# Patient Record
Sex: FEMALE | Race: WHITE | NOT HISPANIC OR LATINO
[De-identification: names, ages, dates, MRNs, and addresses within clinical notes are randomized per-mention and may not be internally consistent; named-entity substitution may affect disease eponyms.]

---

## 2017-03-29 ENCOUNTER — APPOINTMENT (OUTPATIENT)
Dept: ORTHOPEDIC SURGERY | Facility: CLINIC | Age: 77
End: 2017-03-29

## 2017-03-29 DIAGNOSIS — G89.29 PAIN IN LEFT KNEE: ICD-10-CM

## 2017-03-29 DIAGNOSIS — M17.12 UNILATERAL PRIMARY OSTEOARTHRITIS, LEFT KNEE: ICD-10-CM

## 2017-03-29 DIAGNOSIS — M25.562 PAIN IN LEFT KNEE: ICD-10-CM

## 2017-03-29 RX ORDER — DOXYCYCLINE HYCLATE 100 MG/1
100 CAPSULE ORAL
Qty: 30 | Refills: 0 | Status: COMPLETED | COMMUNITY
Start: 2017-03-20

## 2017-03-29 RX ORDER — ATOVAQUONE AND PROGUANIL HYDROCHLORIDE 250; 100 MG/1; MG/1
250-100 TABLET, FILM COATED ORAL
Qty: 20 | Refills: 0 | Status: COMPLETED | COMMUNITY
Start: 2016-11-17

## 2017-03-29 RX ORDER — LIDOCAINE AND PRILOCAINE 25; 25 MG/G; MG/G
2.5-2.5 CREAM TOPICAL
Qty: 30 | Refills: 0 | Status: COMPLETED | COMMUNITY
Start: 2016-12-27

## 2017-03-29 RX ORDER — CIPROFLOXACIN HYDROCHLORIDE 500 MG/1
500 TABLET, FILM COATED ORAL
Qty: 20 | Refills: 0 | Status: COMPLETED | COMMUNITY
Start: 2016-10-03

## 2017-03-29 RX ORDER — CICLOPIROX 7.7 MG/G
0.77 GEL TOPICAL
Qty: 30 | Refills: 0 | Status: COMPLETED | COMMUNITY
Start: 2017-02-28

## 2017-03-29 RX ORDER — MUPIROCIN 20 MG/G
2 OINTMENT TOPICAL
Qty: 22 | Refills: 0 | Status: COMPLETED | COMMUNITY
Start: 2017-02-02

## 2017-03-29 RX ORDER — AMOXICILLIN AND CLAVULANATE POTASSIUM 875; 125 MG/1; MG/1
875-125 TABLET, COATED ORAL
Qty: 14 | Refills: 0 | Status: COMPLETED | COMMUNITY
Start: 2016-12-27

## 2017-03-29 RX ORDER — NYSTATIN AND TRIAMCINOLONE ACETONIDE 100000; 1 [USP'U]/G; MG/G
100000-0.1 OINTMENT TOPICAL
Qty: 30 | Refills: 0 | Status: COMPLETED | COMMUNITY
Start: 2016-11-29

## 2020-03-17 ENCOUNTER — TRANSCRIPTION ENCOUNTER (OUTPATIENT)
Age: 80
End: 2020-03-17

## 2020-03-17 RX ORDER — POVIDONE-IODINE 5 %
1 AEROSOL (ML) TOPICAL ONCE
Refills: 0 | Status: COMPLETED | OUTPATIENT
Start: 2020-03-18 | End: 2020-03-18

## 2020-03-17 NOTE — H&P ADULT - HISTORY OF PRESENT ILLNESS
78 yo F with back pain x     Presents today for elective L2-L5 laminectomy, facetectomy, foraminotomy. 78 yo F with back pain x many years without improvement. Pt states symptoms have worsened for 1 mo and now uses cane for assistance outside her home. Pain radiates down both legs left greater than right. Denies numbness/tingling/paresthesias.     Presents today for elective L2-L5 laminectomy, facetectomy, foraminotomy.

## 2020-03-17 NOTE — PATIENT PROFILE ADULT - EQUIPMENT CURRENTLY USED AT HOME
Pt thinks she has a UTI . No appointments with Dr Yusuf available. Please call [phone]   no yes

## 2020-03-17 NOTE — PATIENT PROFILE ADULT - NSPROEDALEARNPREF_GEN_A_NUR
individual instruction/written material individual instruction/verbal instruction/skill demonstration/written material

## 2020-03-17 NOTE — PATIENT PROFILE ADULT - STATED REASON FOR ADMISSION
laminectomy lumbar: facetectomy and forminotomy L2_5 laminectomy lumbar: facetectomy and foraminotomy L2_5

## 2020-03-17 NOTE — H&P ADULT - PROBLEM SELECTOR PLAN 1
Admit to Orthopedic service  For elective L2-L5 laminectomy, facetectomy, foraminotomy  Medically cleared for surgery by Dr. Lucero

## 2020-03-17 NOTE — H&P ADULT - NSHPPHYSICALEXAM_GEN_ALL_CORE
MSK: decreased ROM of lumbar spine secondary to pain    Rest of PE per medical clearance MSK: decreased ROM of lumbar spine secondary to pain  MSK: No deformity or open wounds. No rashes or lesions. EHL/TA/GS/FHL 5/5 BLE. Sensation intact and equal BLE. Skin warm and well perfused. DP palpable BLE. Cap refill brisk.   Rest of PE per medical clearance

## 2020-03-18 ENCOUNTER — INPATIENT (INPATIENT)
Facility: HOSPITAL | Age: 80
LOS: 0 days | Discharge: HOME | DRG: 517 | End: 2020-03-19
Payer: COMMERCIAL

## 2020-03-18 VITALS
OXYGEN SATURATION: 96 % | SYSTOLIC BLOOD PRESSURE: 143 MMHG | TEMPERATURE: 98 F | DIASTOLIC BLOOD PRESSURE: 86 MMHG | HEART RATE: 75 BPM | RESPIRATION RATE: 18 BRPM | HEIGHT: 65 IN | WEIGHT: 141.32 LBS

## 2020-03-18 DIAGNOSIS — Z98.890 OTHER SPECIFIED POSTPROCEDURAL STATES: Chronic | ICD-10-CM

## 2020-03-18 DIAGNOSIS — M48.061 SPINAL STENOSIS, LUMBAR REGION WITHOUT NEUROGENIC CLAUDICATION: ICD-10-CM

## 2020-03-18 RX ORDER — HYDROMORPHONE HYDROCHLORIDE 2 MG/ML
1 INJECTION INTRAMUSCULAR; INTRAVENOUS; SUBCUTANEOUS
Refills: 0 | Status: DISCONTINUED | OUTPATIENT
Start: 2020-03-18 | End: 2020-03-18

## 2020-03-18 RX ORDER — HYDROMORPHONE HYDROCHLORIDE 2 MG/ML
0.5 INJECTION INTRAMUSCULAR; INTRAVENOUS; SUBCUTANEOUS EVERY 4 HOURS
Refills: 0 | Status: DISCONTINUED | OUTPATIENT
Start: 2020-03-18 | End: 2020-03-19

## 2020-03-18 RX ORDER — CEFAZOLIN SODIUM 1 G
2000 VIAL (EA) INJECTION EVERY 8 HOURS
Refills: 0 | Status: COMPLETED | OUTPATIENT
Start: 2020-03-18 | End: 2020-03-19

## 2020-03-18 RX ORDER — ACETAMINOPHEN 500 MG
975 TABLET ORAL EVERY 8 HOURS
Refills: 0 | Status: DISCONTINUED | OUTPATIENT
Start: 2020-03-18 | End: 2020-03-19

## 2020-03-18 RX ORDER — SODIUM CHLORIDE 9 MG/ML
1000 INJECTION, SOLUTION INTRAVENOUS
Refills: 0 | Status: DISCONTINUED | OUTPATIENT
Start: 2020-03-18 | End: 2020-03-19

## 2020-03-18 RX ORDER — OXYCODONE HYDROCHLORIDE 5 MG/1
10 TABLET ORAL EVERY 4 HOURS
Refills: 0 | Status: DISCONTINUED | OUTPATIENT
Start: 2020-03-18 | End: 2020-03-19

## 2020-03-18 RX ORDER — OXYCODONE HYDROCHLORIDE 5 MG/1
5 TABLET ORAL EVERY 4 HOURS
Refills: 0 | Status: DISCONTINUED | OUTPATIENT
Start: 2020-03-18 | End: 2020-03-19

## 2020-03-18 RX ORDER — CHLORHEXIDINE GLUCONATE 213 G/1000ML
1 SOLUTION TOPICAL ONCE
Refills: 0 | Status: COMPLETED | OUTPATIENT
Start: 2020-03-18 | End: 2020-03-18

## 2020-03-18 RX ADMIN — Medication 975 MILLIGRAM(S): at 22:21

## 2020-03-18 RX ADMIN — OXYCODONE HYDROCHLORIDE 10 MILLIGRAM(S): 5 TABLET ORAL at 16:41

## 2020-03-18 RX ADMIN — OXYCODONE HYDROCHLORIDE 10 MILLIGRAM(S): 5 TABLET ORAL at 20:10

## 2020-03-18 RX ADMIN — OXYCODONE HYDROCHLORIDE 10 MILLIGRAM(S): 5 TABLET ORAL at 16:06

## 2020-03-18 RX ADMIN — Medication 975 MILLIGRAM(S): at 14:17

## 2020-03-18 RX ADMIN — HYDROMORPHONE HYDROCHLORIDE 1 MILLIGRAM(S): 2 INJECTION INTRAMUSCULAR; INTRAVENOUS; SUBCUTANEOUS at 14:42

## 2020-03-18 RX ADMIN — Medication 100 MILLIGRAM(S): at 16:06

## 2020-03-18 RX ADMIN — HYDROMORPHONE HYDROCHLORIDE 1 MILLIGRAM(S): 2 INJECTION INTRAMUSCULAR; INTRAVENOUS; SUBCUTANEOUS at 15:07

## 2020-03-18 RX ADMIN — Medication 975 MILLIGRAM(S): at 21:21

## 2020-03-18 RX ADMIN — CHLORHEXIDINE GLUCONATE 1 APPLICATION(S): 213 SOLUTION TOPICAL at 05:41

## 2020-03-18 RX ADMIN — Medication 975 MILLIGRAM(S): at 14:38

## 2020-03-18 RX ADMIN — Medication 1 APPLICATION(S): at 06:20

## 2020-03-18 RX ADMIN — OXYCODONE HYDROCHLORIDE 10 MILLIGRAM(S): 5 TABLET ORAL at 21:10

## 2020-03-18 NOTE — PACU DISCHARGE NOTE - COMMENTS
pt met criteria for discharge- s/p L2-L4 laminecttomy and foraminotomy with lumbar initial gauze/tegaderm dressing CDI and right mid back Hemovac drain without output. received pt from OR- somnolent but easily arousable and responsive to voice and tactile stimuli- Able to move all extremities- c/o of  pre-op back Pain w/o sensory and neuro deficits- Bilateral +2 palpable DP and PD- pt denies Pain at present was placed prone in OR- facial patric and slight blanchable redness noted which will resolve-No S&S of skin breakdown pt met criteria for discharge- s/p L2-L4 laminecttomy and foraminotomy with lumbar initial gauze/tegaderm dressing CDI and right mid back Hemovac drain without output. received pt from OR- somnolent but easily arousable and responsive to voice and tactile stimuli- Able to move all extremities- c/o of  pre-op back Pain w/o sensory and neuro deficits- Bilateral +2 palpable DP and PD- pt  was placed prone in OR- facial patric and slight blanchable redness noted which will resolve-No S&S of skin breakdown- pt tolerating po intake and jenna crackers well.-denies N/V-pt expresses Pain reliefs/p tylenols ppt left unit bed on supplemental oxygen to 845-1

## 2020-03-18 NOTE — PROGRESS NOTE ADULT - SUBJECTIVE AND OBJECTIVE BOX
Orthopaedics Post Op Check    Procedure: Laminectomy L2-L5  Surgeon: Dr. Howard     Pt comfortable, without complaints  Denies CP, SOB, N/V, numbness/tingling     Vital Signs Last 24 Hrs  T(C): 36.4 (18 Mar 2020 06:05), Max: 36.4 (18 Mar 2020 06:05)  T(F): 97.6 (18 Mar 2020 06:05), Max: 97.6 (18 Mar 2020 06:05)  HR: 78 (18 Mar 2020 13:00) (75 - 91)  BP: 117/58 (18 Mar 2020 13:00) (114/58 - 143/86)  BP(mean): 82 (18 Mar 2020 13:00) (81 - 85)  RR: 17 (18 Mar 2020 13:00) (13 - 25)  SpO2: 98% (18 Mar 2020 13:00) (96% - 98%)  AVSS, NAD    Dressing C/D/I; HV x 1   General: Pt Alert and oriented     Pulses: DP pulses 2+   Sensation: SLT in tact to distal bilateral lower extremities   Motor: EHL/FHL/TA/GS 5/5 motor strength bilaterally           Post op XR: fluoroscopy utilized intra op to confirm level     A/P: 79yFemale POD#0 s/p Laminectomy L2-L5   - Stable  - Pain Control  - DVT ppx: SCDs  - Post op abx: Ancef  - PT, WBS: WBAT  - F/U AM Labs

## 2020-03-19 ENCOUNTER — TRANSCRIPTION ENCOUNTER (OUTPATIENT)
Age: 80
End: 2020-03-19

## 2020-03-19 VITALS
SYSTOLIC BLOOD PRESSURE: 124 MMHG | TEMPERATURE: 97 F | HEART RATE: 63 BPM | OXYGEN SATURATION: 97 % | DIASTOLIC BLOOD PRESSURE: 70 MMHG | RESPIRATION RATE: 16 BRPM

## 2020-03-19 LAB
ANION GAP SERPL CALC-SCNC: 13 MMOL/L — SIGNIFICANT CHANGE UP (ref 5–17)
BASOPHILS # BLD AUTO: 0.01 K/UL — SIGNIFICANT CHANGE UP (ref 0–0.2)
BASOPHILS NFR BLD AUTO: 0.1 % — SIGNIFICANT CHANGE UP (ref 0–2)
BUN SERPL-MCNC: 16 MG/DL — SIGNIFICANT CHANGE UP (ref 7–23)
CALCIUM SERPL-MCNC: 8.7 MG/DL — SIGNIFICANT CHANGE UP (ref 8.4–10.5)
CHLORIDE SERPL-SCNC: 102 MMOL/L — SIGNIFICANT CHANGE UP (ref 96–108)
CO2 SERPL-SCNC: 24 MMOL/L — SIGNIFICANT CHANGE UP (ref 22–31)
CREAT SERPL-MCNC: 0.9 MG/DL — SIGNIFICANT CHANGE UP (ref 0.5–1.3)
EOSINOPHIL # BLD AUTO: 0.01 K/UL — SIGNIFICANT CHANGE UP (ref 0–0.5)
EOSINOPHIL NFR BLD AUTO: 0.1 % — SIGNIFICANT CHANGE UP (ref 0–6)
GLUCOSE SERPL-MCNC: 199 MG/DL — HIGH (ref 70–99)
HCT VFR BLD CALC: 44.9 % — SIGNIFICANT CHANGE UP (ref 34.5–45)
HGB BLD-MCNC: 14.7 G/DL — SIGNIFICANT CHANGE UP (ref 11.5–15.5)
IMM GRANULOCYTES NFR BLD AUTO: 0.5 % — SIGNIFICANT CHANGE UP (ref 0–1.5)
LYMPHOCYTES # BLD AUTO: 1.38 K/UL — SIGNIFICANT CHANGE UP (ref 1–3.3)
LYMPHOCYTES # BLD AUTO: 7.7 % — LOW (ref 13–44)
MCHC RBC-ENTMCNC: 29.7 PG — SIGNIFICANT CHANGE UP (ref 27–34)
MCHC RBC-ENTMCNC: 32.7 GM/DL — SIGNIFICANT CHANGE UP (ref 32–36)
MCV RBC AUTO: 90.7 FL — SIGNIFICANT CHANGE UP (ref 80–100)
MONOCYTES # BLD AUTO: 0.87 K/UL — SIGNIFICANT CHANGE UP (ref 0–0.9)
MONOCYTES NFR BLD AUTO: 4.9 % — SIGNIFICANT CHANGE UP (ref 2–14)
NEUTROPHILS # BLD AUTO: 15.5 K/UL — HIGH (ref 1.8–7.4)
NEUTROPHILS NFR BLD AUTO: 86.7 % — HIGH (ref 43–77)
NRBC # BLD: 0 /100 WBCS — SIGNIFICANT CHANGE UP (ref 0–0)
PLATELET # BLD AUTO: 233 K/UL — SIGNIFICANT CHANGE UP (ref 150–400)
POTASSIUM SERPL-MCNC: 4.6 MMOL/L — SIGNIFICANT CHANGE UP (ref 3.5–5.3)
POTASSIUM SERPL-SCNC: 4.6 MMOL/L — SIGNIFICANT CHANGE UP (ref 3.5–5.3)
RBC # BLD: 4.95 M/UL — SIGNIFICANT CHANGE UP (ref 3.8–5.2)
RBC # FLD: 12.6 % — SIGNIFICANT CHANGE UP (ref 10.3–14.5)
SODIUM SERPL-SCNC: 139 MMOL/L — SIGNIFICANT CHANGE UP (ref 135–145)
WBC # BLD: 17.86 K/UL — HIGH (ref 3.8–10.5)
WBC # FLD AUTO: 17.86 K/UL — HIGH (ref 3.8–10.5)

## 2020-03-19 RX ORDER — OXYCODONE HYDROCHLORIDE 5 MG/1
1 TABLET ORAL
Qty: 30 | Refills: 0
Start: 2020-03-19 | End: 2020-03-23

## 2020-03-19 RX ADMIN — OXYCODONE HYDROCHLORIDE 10 MILLIGRAM(S): 5 TABLET ORAL at 11:37

## 2020-03-19 RX ADMIN — OXYCODONE HYDROCHLORIDE 10 MILLIGRAM(S): 5 TABLET ORAL at 12:37

## 2020-03-19 RX ADMIN — OXYCODONE HYDROCHLORIDE 10 MILLIGRAM(S): 5 TABLET ORAL at 05:41

## 2020-03-19 RX ADMIN — Medication 975 MILLIGRAM(S): at 05:40

## 2020-03-19 RX ADMIN — Medication 100 MILLIGRAM(S): at 00:17

## 2020-03-19 RX ADMIN — Medication 975 MILLIGRAM(S): at 14:43

## 2020-03-19 RX ADMIN — Medication 975 MILLIGRAM(S): at 06:40

## 2020-03-19 RX ADMIN — OXYCODONE HYDROCHLORIDE 10 MILLIGRAM(S): 5 TABLET ORAL at 06:40

## 2020-03-19 RX ADMIN — Medication 975 MILLIGRAM(S): at 15:43

## 2020-03-19 NOTE — DISCHARGE NOTE PROVIDER - NSDCFUADDINST_GEN_ALL_CORE_FT
No strenuous activity (bending/twisting), heavy lifting, driving or returning to work until cleared by MD.  Change dressing daily with gauze/tape till post-op day #5, then leave incision open to air.  You may shower post-op day#5, keep incision clean and dry.   Try to have regular bowel movements, take stool softener or laxative if necessary.  May apply ice to affected areas to decrease swelling.  Call to schedule an appt with Dr. Howard for follow up, if you have staples or sutures they will be removed in office.  Contact your doctor if you experience: fever greater than 101.5, chills, chest pain, difficulty breathing, redness or excessive drainage around the incision, other concerns.  Follow up with your primary care provider. No strenuous activity (bending/twisting), heavy lifting, driving or returning to work until cleared by MD.  Record drain output daily and update Dr. Howard, remove drain on Saturday then apply gauze/tegaderm dressing.  You may remove dressing and shower post-op day#5, keep incision clean and dry.   Try to have regular bowel movements, take stool softener or laxative if necessary.  May apply ice to affected areas to decrease swelling.  Call to schedule an appt with Dr. Howard for follow up, if you have staples or sutures they will be removed in office.  Contact your doctor if you experience: fever greater than 101.5, chills, chest pain, difficulty breathing, redness or excessive drainage around the incision, other concerns.  Follow up with your primary care provider.

## 2020-03-19 NOTE — PROGRESS NOTE ADULT - SUBJECTIVE AND OBJECTIVE BOX
Ortho Note    Pt comfortable without complaints, pain controlled  Denies CP, SOB, N/V, numbness/tingling     Vital Signs Last 24 Hrs  T(C): 36.2 (03-19-20 @ 09:08), Max: 36.2 (03-19-20 @ 09:08)  T(F): 97.2 (03-19-20 @ 09:08), Max: 97.2 (03-19-20 @ 09:08)  HR: 63 (03-19-20 @ 09:08) (63 - 63)  BP: 124/70 (03-19-20 @ 09:08) (124/70 - 124/70)  BP(mean): --  RR: 16 (03-19-20 @ 09:08) (16 - 16)  SpO2: 97% (03-19-20 @ 09:08) (97% - 97%)    General: Pt Alert and oriented, NAD  DSG C/D/I back, using aseptic technique stitch removed from drain, new dsg applied  Pulses intact b/l LE  Sensation intact b/l LE  Motor: EHL/FHL/TA/GS 5/5 b/l                          14.7   17.86 )-----------( 233      ( 19 Mar 2020 06:51 )             44.9   19 Mar 2020 06:51    139    |  102    |  16     ----------------------------<  199    4.6     |  24     |  0.90           A/P: 79yFemale POD#1 s/p Lami L2-5  - Stable  - Pain Control  - DVT ppx: scds  - PT, WBS: wbat  - Discussed with Dr. Howard, discharge home with drain, patient instructed on how to empty and record drain output, drain to be removed Saturday. Patient states able and willing to perform task.    Ortho Pager [phone]

## 2020-03-19 NOTE — DISCHARGE NOTE PROVIDER - NSDCCPCAREPLAN_GEN_ALL_CORE_FT
PRINCIPAL DISCHARGE DIAGNOSIS  Diagnosis: Spinal stenosis of lumbar region  Assessment and Plan of Treatment: improvement s/p Lami L2-5

## 2020-03-19 NOTE — PHYSICAL THERAPY INITIAL EVALUATION ADULT - PERTINENT HX OF CURRENT PROBLEM, REHAB EVAL
78 yo F with back pain x many years without improvement. Pt states symptoms have worsened for 1 mo and now uses cane for assistance outside her home. Pain radiates down both legs left greater than right. Denies numbness/tingling/paresthesias.

## 2020-03-19 NOTE — PHYSICAL THERAPY INITIAL EVALUATION ADULT - ADDITIONAL COMMENTS
Pt lives in an elevator apartment alone, pt has straight cane at home, reports she does not use often, reports she often trips on it.

## 2020-03-19 NOTE — PHYSICAL THERAPY INITIAL EVALUATION ADULT - GENERAL OBSERVATIONS, REHAB EVAL
Pt received supine with HOB elevated in NAD all lines in tact, call bell in reach, cleared for PT evaluation by RN, pt agreeable.

## 2020-03-19 NOTE — DISCHARGE NOTE PROVIDER - CARE PROVIDER_API CALL
Hany Howard)  Orthopaedic Surgery  [address]  [place], [place] [address]  Phone: [phone]  Fax: [phone]  Follow Up Time: 2 weeks

## 2020-03-19 NOTE — DISCHARGE NOTE NURSING/CASE MANAGEMENT/SOCIAL WORK - PATIENT PORTAL LINK FT
You can access the FollowMyHealth Patient Portal offered by [facility] by registering at the following website: [email or web address]. By joining [email or web address]’s FollowMyHealth portal, you will also be able to view your health information using other applications (apps) compatible with our system.

## 2020-03-19 NOTE — DISCHARGE NOTE PROVIDER - NSDCACTIVITY_GEN_ALL_CORE
Do not drive or operate machinery/Walking - Outdoors allowed/Stairs allowed/Walking - Indoors allowed/No heavy lifting/straining

## 2020-03-19 NOTE — PROGRESS NOTE ADULT - SUBJECTIVE AND OBJECTIVE BOX
Orthopaedics Progress Note    Procedure: Laminectomy L2-L5  Surgeon: Dr. Howard     Pt comfortable, without complaints  Denies CP, SOB, N/V, numbness/tingling     Vital Signs Last 24 Hrs  T(C): 36.2 (19 Mar 2020 09:08), Max: 36.8 (18 Mar 2020 20:43)  T(F): 97.2 (19 Mar 2020 09:08), Max: 98.3 (18 Mar 2020 20:43)  HR: 63 (19 Mar 2020 09:08) (63 - 91)  BP: 124/70 (19 Mar 2020 09:08) (114/58 - 144/76)  BP(mean): 89 (18 Mar 2020 15:15) (81 - 89)  RR: 16 (19 Mar 2020 09:08) (13 - 26)  SpO2: 97% (19 Mar 2020 09:08) (95% - 98%)    Dressing C/D/I; HV x 1   General: Pt Alert and oriented     Pulses: DP pulses 2+   Sensation: SLT in tact to distal bilateral lower extremities   Motor: EHL/FHL/TA/GS 5/5 motor strength bilaterally       A/P: 79yFemale POD#1 s/p Laminectomy L2-L5   - Stable  - Pain Control  - DVT ppx: SCDs  - Post op abx: Ancef  - PT, WBS: WBAT  - F/U AM Labs

## 2020-03-19 NOTE — DISCHARGE NOTE PROVIDER - HOSPITAL COURSE
Admitted    Surgery 3/18 - Lami L2-5    Mariah-op Antibiotics    Pain control    DVT prophylaxis    OOB/Physical Therapy

## 2020-03-25 PROCEDURE — 86901 BLOOD TYPING SEROLOGIC RH(D): CPT

## 2020-03-25 PROCEDURE — 76000 FLUOROSCOPY <1 HR PHYS/QHP: CPT

## 2020-03-25 PROCEDURE — 97161 PT EVAL LOW COMPLEX 20 MIN: CPT

## 2020-03-25 PROCEDURE — 80048 BASIC METABOLIC PNL TOTAL CA: CPT

## 2020-03-25 PROCEDURE — 95940 IONM IN OPERATNG ROOM 15 MIN: CPT

## 2020-03-25 PROCEDURE — 85025 COMPLETE CBC W/AUTO DIFF WBC: CPT

## 2020-03-25 PROCEDURE — 86850 RBC ANTIBODY SCREEN: CPT

## 2020-03-25 PROCEDURE — 36415 COLL VENOUS BLD VENIPUNCTURE: CPT

## 2020-03-26 DIAGNOSIS — M48.061 SPINAL STENOSIS, LUMBAR REGION WITHOUT NEUROGENIC CLAUDICATION: ICD-10-CM

## 2020-03-26 DIAGNOSIS — M41.56 OTHER SECONDARY SCOLIOSIS, LUMBAR REGION: ICD-10-CM

## 2020-03-26 DIAGNOSIS — M19.90 UNSPECIFIED OSTEOARTHRITIS, UNSPECIFIED SITE: ICD-10-CM

## 2022-09-29 PROBLEM — M19.90 UNSPECIFIED OSTEOARTHRITIS, UNSPECIFIED SITE: Chronic | Status: ACTIVE | Noted: 2020-03-17

## 2022-09-29 PROBLEM — M48.061 SPINAL STENOSIS, LUMBAR REGION WITHOUT NEUROGENIC CLAUDICATION: Chronic | Status: ACTIVE | Noted: 2020-03-17

## 2022-10-12 ENCOUNTER — APPOINTMENT (OUTPATIENT)
Dept: UROLOGY | Facility: CLINIC | Age: 82
End: 2022-10-12

## 2022-10-12 VITALS
TEMPERATURE: 97.6 F | DIASTOLIC BLOOD PRESSURE: 78 MMHG | BODY MASS INDEX: 23.32 KG/M2 | SYSTOLIC BLOOD PRESSURE: 148 MMHG | WEIGHT: 140 LBS | HEIGHT: 65 IN | OXYGEN SATURATION: 98 % | HEART RATE: 62 BPM

## 2022-10-12 DIAGNOSIS — R32 UNSPECIFIED URINARY INCONTINENCE: ICD-10-CM

## 2022-10-12 PROCEDURE — 51798 US URINE CAPACITY MEASURE: CPT

## 2022-10-12 PROCEDURE — 99203 OFFICE O/P NEW LOW 30 MIN: CPT

## 2022-10-12 NOTE — REVIEW OF SYSTEMS
[Dry Eyes] : dryness of the eyes [Shortness Of Breath] : shortness of breath [Diarrhea] : diarrhea [see HPI] : see HPI [Easy Bruising] : a tendency for easy bruising [Negative] : Endocrine

## 2022-10-13 LAB
APPEARANCE: ABNORMAL
BACTERIA: ABNORMAL
BILIRUB UR QL STRIP: NORMAL
BILIRUBIN URINE: NEGATIVE
BLOOD URINE: NEGATIVE
CLARITY UR: CLEAR
COLLECTION METHOD: NORMAL
COLOR: YELLOW
GLUCOSE QUALITATIVE U: NEGATIVE
GLUCOSE UR-MCNC: NORMAL
HCG UR QL: 0.2 EU/DL
HGB UR QL STRIP.AUTO: NORMAL
HYALINE CASTS: 0 /LPF
KETONES UR-MCNC: NORMAL
KETONES URINE: NEGATIVE
LEUKOCYTE ESTERASE UR QL STRIP: NORMAL
LEUKOCYTE ESTERASE URINE: ABNORMAL
MICROSCOPIC-UA: NORMAL
NITRITE UR QL STRIP: NORMAL
NITRITE URINE: NEGATIVE
PH UR STRIP: 5.5
PH URINE: 6
PROT UR STRIP-MCNC: NORMAL
PROTEIN URINE: NORMAL
RED BLOOD CELLS URINE: 4 /HPF
SP GR UR STRIP: 1.02
SPECIFIC GRAVITY URINE: 1.02
SQUAMOUS EPITHELIAL CELLS: 1 /HPF
UROBILINOGEN URINE: NORMAL
WHITE BLOOD CELLS URINE: 270 /HPF

## 2022-10-13 NOTE — PHYSICAL EXAM
[General Appearance - Well Developed] : well developed [General Appearance - Well Nourished] : well nourished [Normal Appearance] : normal appearance [Well Groomed] : well groomed [General Appearance - In No Acute Distress] : no acute distress [Edema] : no peripheral edema [Respiration, Rhythm And Depth] : normal respiratory rhythm and effort [Exaggerated Use Of Accessory Muscles For Inspiration] : no accessory muscle use [Abdomen Soft] : soft [Abdomen Tenderness] : non-tender [Costovertebral Angle Tenderness] : no ~M costovertebral angle tenderness [Urethral Meatus] : normal urethra [Normal Station and Gait] : the gait and station were normal for the patient's age [] : no rash [No Focal Deficits] : no focal deficits [Oriented To Time, Place, And Person] : oriented to person, place, and time [Affect] : the affect was normal [Mood] : the mood was normal [Not Anxious] : not anxious [FreeTextEntry1] : positive CST, no prolapse

## 2022-10-13 NOTE — ASSESSMENT
[FreeTextEntry1] : Pt is a 81 yo F who presents with unaware incontinence with demonstrated PHUONG on exam today. We discussed treatment options for her incontinence including medical therapy and surgical therapy. Before we decide which route to choose, we will study her bladder with urodynamics. She will also begin PFPT for bladder spasms and to learn more about how she can control her urination. \par \par 1. UA micro for microhematuria \par 2. UCx\par 3. Schedule UDS\par 4. PFPT\par 5. RTC after UDS for plan \par

## 2022-10-13 NOTE — LETTER BODY
[Dear  ___] : Dear  [unfilled], [Consult Letter:] : I had the pleasure of evaluating your patient, [unfilled]. [Please see my note below.] : Please see my note below. [Consult Closing:] : Thank you very much for allowing me to participate in the care of this patient.  If you have any questions, please do not hesitate to contact me. [Sincerely,] : Sincerely, [FreeTextEntry3] : Dr. Yeni Bhandari\par

## 2022-10-13 NOTE — ADDENDUM
[FreeTextEntry1] : A portion of this note was written by [Kayla Carreno] on 10/12/2022  acting as a scribe for Dr. Bhandari. \par \par I have personally reviewed the chart and agree that the record accurately reflects my personal performance and the history, physical exam, assessment, and plan.\par

## 2022-10-13 NOTE — HISTORY OF PRESENT ILLNESS
[FreeTextEntry1] : Pt is an 83 yo F  (vaginal) who presents today with urinary incontinence, referred by Dr. Bailey Martel. She has had problems with incontinence almost her entire life. In  she had fibroid removal and bladdr lift surgery and since then she had not had any "sensation to void". She slowly developed incontinence which is unaware. This became worse after spine surgery in  and after COVID vaccine in 2022. After COVID vaccine she feels her urination is much worse and less controlled. She had some bowel incontinence at one point as well.  If she time voids, she can urinate.  She also reports a burning in urethra after urination, it did not improve after stopping wiping with wipes after urination as suggested by her GYN. \par \par She wears 3-4 depends/day. If she wears pads she has to change for frequently and sometimes has to change her clothes. She leaks more with walking or on treadmill. She saw a urologist 10 years ago and started her on a medication that she does not remember but it did not work and she felt like he did not even evaluate her so she never returned. She sometimes takes a supplement called "Enuraid" which is helpful and has belladonna in it, but it was discontinued. \par \par She has history of frequent UTIs and reports when she has urine infection it "can go straight to her blood." She takes Cipro when she feels a UTI coming on. Her GYN gave her nitrofurantoin, but she has not needed it yet. Her last UTI was 2022. She will sometimes has a uncomfortable feeling of a possible UTI but if she drinks water and has cranberry juice, it will clear out. \par \par Sexually active: No  \par \par Udip: trace intact blood, large WBCs \par PVR: 64 cc \par \par Prolapse sensation: denies \par \par PMH: asthma, T2DM, HTN, \par PSH: spinal stenosis surgery (3/2020), surgery R thumb (2021); Di Bree laser 2016 (vaginal dryness) \par FH: lung CA (father); mother Stroke; denies  malignancies \par SH: non-smoker, no alcohol, , retired\par \par Habits: 1-2 cups green tea; water \par \par Allergies: NKDA \par \par Meds: metoprolol, metformin, albuterol, vitamin D, Vagifem TID, Boric Acid TID

## 2022-11-08 ENCOUNTER — NON-APPOINTMENT (OUTPATIENT)
Age: 82
End: 2022-11-08

## 2022-11-08 DIAGNOSIS — N39.0 URINARY TRACT INFECTION, SITE NOT SPECIFIED: ICD-10-CM

## 2022-11-08 RX ORDER — SULFAMETHOXAZOLE AND TRIMETHOPRIM 800; 160 MG/1; MG/1
800-160 TABLET ORAL
Qty: 6 | Refills: 0 | Status: ACTIVE | COMMUNITY
Start: 2022-11-08 | End: 1900-01-01

## 2022-11-10 ENCOUNTER — APPOINTMENT (OUTPATIENT)
Dept: UROLOGY | Facility: CLINIC | Age: 82
End: 2022-11-10

## 2022-11-10 VITALS
TEMPERATURE: 96.7 F | DIASTOLIC BLOOD PRESSURE: 72 MMHG | SYSTOLIC BLOOD PRESSURE: 128 MMHG | OXYGEN SATURATION: 97 % | HEART RATE: 62 BPM

## 2022-11-10 LAB
BACTERIA UR CULT: ABNORMAL
BILIRUB UR QL STRIP: NORMAL
CLARITY UR: CLEAR
COLLECTION METHOD: NORMAL
GLUCOSE UR-MCNC: NORMAL
HCG UR QL: 0.2 EU/DL
HGB UR QL STRIP.AUTO: NORMAL
KETONES UR-MCNC: NORMAL
LEUKOCYTE ESTERASE UR QL STRIP: NORMAL
NITRITE UR QL STRIP: NORMAL
PH UR STRIP: 5.5
PROT UR STRIP-MCNC: NORMAL
SP GR UR STRIP: >1.03

## 2022-11-10 PROCEDURE — 81002 URINALYSIS NONAUTO W/O SCOPE: CPT

## 2022-11-10 PROCEDURE — 51797 INTRAABDOMINAL PRESSURE TEST: CPT

## 2022-11-10 PROCEDURE — 51784 ANAL/URINARY MUSCLE STUDY: CPT

## 2022-11-10 PROCEDURE — 51728 CYSTOMETROGRAM W/VP: CPT

## 2022-11-10 PROCEDURE — 51741 ELECTRO-UROFLOWMETRY FIRST: CPT

## 2022-11-10 NOTE — ASSESSMENT
[FreeTextEntry1] : Pt is an 81 yo F with unaware incontinence with demonstrated PHUONG on exam. At her last visit on 10/12/22, she was advised to begin PFPT for bladder spasms/to learn about how to control her urination - she attended her first session on 11/7/22. She presents today for a urodynamics evaluation. Pt reports that her urinary urgency has slightly improved. \par \par Today's UDS evaluation showed that her bladder is unstable, she is experiencing detrusor instability, and she has mild PHUONG. \par \par We reviewed her UDS results, and discussed the treatment options available to address her bladder/detrusor instability, which include use of an oral medication, and intravesical botox injections. We also discussed treatment options for her PHUONG, which include a midurethral sling placement. \par \par At this time, she does not wish to pursue an oral medication treatment option, but is interested in intravesical botox injections. She will return for intravesical botox. \par \par I advised that if, after she undergoes botox, she is still bothered by her PHUONG, we can pursue additional treatment options, such as a midurethral sling. \par \par Pt does not wish to continue PFPT, and will stop attending sessions. \par \par Urine was sent for culture.\par \par Patient expressed understanding.

## 2022-11-10 NOTE — HISTORY OF PRESENT ILLNESS
[FreeTextEntry1] : Pt is an 83 yo F with unaware incontinence with demonstrated PHUONG on exam. At her last visit on 10/12/22, she was advised to begin PFPT for bladder spasms/to learn about how to control her urination - she attended her first session on 22. She presents today for a urodynamics evaluation. Pt reports that her urinary urgency has slightly improved. \par \par Udip: (+) trace leuks \par \par 10/12/22-- Pt is an 83 yo F  (vaginal) who presents today with urinary incontinence, referred by Dr. Bailey Martel. She has had problems with incontinence almost her entire life. In  she had fibroid removal and bladdr lift surgery and since then she had not had any "sensation to void". She slowly developed incontinence which is unaware. This became worse after spine surgery in  and after COVID vaccine in 2022. After COVID vaccine she feels her urination is much worse and less controlled. She had some bowel incontinence at one point as well.  If she time voids, she can urinate.  She also reports a burning in urethra after urination, it did not improve after stopping wiping with wipes after urination as suggested by her GYN. \par \par She wears 3-4 depends/day. If she wears pads she has to change for frequently and sometimes has to change her clothes. She leaks more with walking or on treadmill. She saw a urologist 10 years ago and started her on a medication that she does not remember but it did not work and she felt like he did not even evaluate her so she never returned. She sometimes takes a supplement called "Enuraid" which is helpful and has belladonna in it, but it was discontinued. \par \par She has history of frequent UTIs and reports when she has urine infection it "can go straight to her blood." She takes Cipro when she feels a UTI coming on. Her GYN gave her nitrofurantoin, but she has not needed it yet. Her last UTI was 2022. She will sometimes has a uncomfortable feeling of a possible UTI but if she drinks water and has cranberry juice, it will clear out. \par \par Sexually active: No  \par \par Udip: trace intact blood, large WBCs \par PVR: 64 cc \par \par Prolapse sensation: denies \par \par PMH: asthma, T2DM, HTN, \par PSH: spinal stenosis surgery (3/2020), surgery R thumb (2021); Di Bree laser 2016 (vaginal dryness) \par FH: lung CA (father); mother Stroke; denies  malignancies \par SH: non-smoker, no alcohol, , retired\par \par Habits: 1-2 cups green tea; water \par \par Allergies: NKDA \par \par Meds: metoprolol, metformin, albuterol, vitamin D, Vagifem TID, Boric Acid TID

## 2022-11-10 NOTE — ADDENDUM
[FreeTextEntry1] : A portion of this note was written by [Kayla Carreno] on 11/10/2022  acting as a scribe for Dr. Bhandari. \par \par I have personally reviewed the chart and agree that the record accurately reflects my personal performance and the history, physical exam, assessment, and plan.\par

## 2022-11-10 NOTE — PHYSICAL EXAM
[General Appearance - Well Developed] : well developed [General Appearance - Well Nourished] : well nourished [Normal Appearance] : normal appearance [Well Groomed] : well groomed [General Appearance - In No Acute Distress] : no acute distress [Abdomen Soft] : soft [Abdomen Tenderness] : non-tender [Costovertebral Angle Tenderness] : no ~M costovertebral angle tenderness [Urethral Meatus] : normal urethra [Edema] : no peripheral edema [] : no respiratory distress [Respiration, Rhythm And Depth] : normal respiratory rhythm and effort [Exaggerated Use Of Accessory Muscles For Inspiration] : no accessory muscle use [Oriented To Time, Place, And Person] : oriented to person, place, and time [Affect] : the affect was normal [Mood] : the mood was normal [Not Anxious] : not anxious [Normal Station and Gait] : the gait and station were normal for the patient's age [No Focal Deficits] : no focal deficits [FreeTextEntry1] : positive CST, no prolapse

## 2022-11-15 LAB — BACTERIA UR CULT: NORMAL

## 2022-11-17 ENCOUNTER — APPOINTMENT (OUTPATIENT)
Dept: UROLOGY | Facility: CLINIC | Age: 82
End: 2022-11-17
Payer: MEDICARE

## 2022-11-17 VITALS
OXYGEN SATURATION: 98 % | HEART RATE: 66 BPM | DIASTOLIC BLOOD PRESSURE: 79 MMHG | SYSTOLIC BLOOD PRESSURE: 143 MMHG | TEMPERATURE: 97.5 F

## 2022-11-17 PROCEDURE — 81003 URINALYSIS AUTO W/O SCOPE: CPT | Mod: QW

## 2022-11-17 NOTE — ASSESSMENT
[FreeTextEntry1] : Pt is an 83 yo F with PHUONG. Urodynamics evaluation from 11/10/22 showed that her bladder is unstable, she is experiencing detrusor instability, and she has mild PHUONG. She presents today to receive intravesical botox. \par \par I injected 100 units of botox. \par \par Pt will return in 10 days for a post void check. \par \par Urine was sent for culture.\par \par Patient expressed understanding.\par

## 2022-11-17 NOTE — ADDENDUM
[FreeTextEntry1] : A portion of this note was written by [Kayla Carreno] on 11/17/2022  acting as a scribe for Dr. Bhandari. \par \par I have personally reviewed the chart and agree that the record accurately reflects my personal performance and the history, physical exam, assessment, and plan.\par \par

## 2022-11-17 NOTE — HISTORY OF PRESENT ILLNESS
[FreeTextEntry1] : Pt is an 81 yo F with PHUONG. Urodynamics evaluation from 11/10/22 showed that her bladder is unstable, she is experiencing detrusor instability, and she has mild PHUONG. She presents today to receive intravesical botox. \par \par Udip: (+) glucose\par \par 11/10/22-- Pt is an 81 yo F with unaware incontinence with demonstrated PHUONG on exam. At her last visit on 10/12/22, she was advised to begin PFPT for bladder spasms/to learn about how to control her urination - she attended her first session on 22. She presents today for a urodynamics evaluation. Pt reports that her urinary urgency has slightly improved. \par \par Udip: (+) trace leuks \par \par 10/12/22-- Pt is an 81 yo F  (vaginal) who presents today with urinary incontinence, referred by Dr. Bailey Martel. She has had problems with incontinence almost her entire life. In  she had fibroid removal and bladdr lift surgery and since then she had not had any "sensation to void". She slowly developed incontinence which is unaware. This became worse after spine surgery in  and after COVID vaccine in 2022. After COVID vaccine she feels her urination is much worse and less controlled. She had some bowel incontinence at one point as well.  If she time voids, she can urinate.  She also reports a burning in urethra after urination, it did not improve after stopping wiping with wipes after urination as suggested by her GYN. \par \par She wears 3-4 depends/day. If she wears pads she has to change for frequently and sometimes has to change her clothes. She leaks more with walking or on treadmill. She saw a urologist 10 years ago and started her on a medication that she does not remember but it did not work and she felt like he did not even evaluate her so she never returned. She sometimes takes a supplement called "Enuraid" which is helpful and has belladonna in it, but it was discontinued. \par \par She has history of frequent UTIs and reports when she has urine infection it "can go straight to her blood." She takes Cipro when she feels a UTI coming on. Her GYN gave her nitrofurantoin, but she has not needed it yet. Her last UTI was 2022. She will sometimes has a uncomfortable feeling of a possible UTI but if she drinks water and has cranberry juice, it will clear out. \par \par Sexually active: No  \par \par Udip: trace intact blood, large WBCs \par PVR: 64 cc \par \par Prolapse sensation: denies \par \par PMH: asthma, T2DM, HTN, \par PSH: spinal stenosis surgery (3/2020), surgery R thumb (2021); Di Bree laser 2016 (vaginal dryness) \par FH: lung CA (father); mother Stroke; denies  malignancies \par SH: non-smoker, no alcohol, , retired\par \par Habits: 1-2 cups green tea; water \par \par Allergies: NKDA \par \par Meds: metoprolol, metformin, albuterol, vitamin D, Vagifem TID, Boric Acid TID

## 2022-11-30 ENCOUNTER — APPOINTMENT (OUTPATIENT)
Dept: UROLOGY | Facility: CLINIC | Age: 82
End: 2022-11-30

## 2022-11-30 VITALS
HEART RATE: 63 BPM | DIASTOLIC BLOOD PRESSURE: 91 MMHG | TEMPERATURE: 97.7 F | OXYGEN SATURATION: 97 % | SYSTOLIC BLOOD PRESSURE: 150 MMHG

## 2022-11-30 PROCEDURE — 99213 OFFICE O/P EST LOW 20 MIN: CPT

## 2022-11-30 PROCEDURE — 51798 US URINE CAPACITY MEASURE: CPT

## 2022-12-01 NOTE — ASSESSMENT
[FreeTextEntry1] : Pt is an 83 yo F  with UUI s/p intravesical botox injection (100 units) on 22. She presents today for a post void check - today's PVR was 29 cc's. \par \par Pt reports that she is feeling 80% better - she is now having two episodes of urinary incontinence per day compared to 6-8 episodes per day before botox. \par \par I advised that she may see more improvement in her urinary symptoms in the next week or so. \par \par I encouraged her to keep track of how many episodes of urinary incontinence she is experiencing when she is feeling her best so I can adjust the dosage of botox she receives in the future. \par \par Pt will schedule a follow-up visit to receive another botox injection when her urinary symptoms return. \par \par Patient expressed understanding.\par \par

## 2022-12-01 NOTE — HISTORY OF PRESENT ILLNESS
[FreeTextEntry1] : Pt is an 83 yo F  with UUI s/p intravesical botox injection (100 units) on 22. She presents today for a post void check. Pt reports that she is feeling 80% better - she is now having two episodes of urinary incontinence/day compared to 6-8 episodes/day before botox. \par \par Udip: (did not leave urine sample)\par PVR: 29 cc (to rule out incomplete bladder emptying)\par \par 22-- Pt is an 83 yo F with PHUONG. Urodynamics evaluation from 11/10/22 showed that her bladder is unstable, she is experiencing detrusor instability, and she has mild PHUONG. She presents today to receive intravesical botox. \par \par Udip: (+) glucose\par \par 11/10/22-- Pt is an 83 yo F with unaware incontinence with demonstrated PHUONG on exam. At her last visit on 10/12/22, she was advised to begin PFPT for bladder spasms/to learn about how to control her urination - she attended her first session on 22. She presents today for a urodynamics evaluation. Pt reports that her urinary urgency has slightly improved. \par \par Udip: (+) trace leuks \par \par 10/12/22-- Pt is an 83 yo F  (vaginal) who presents today with urinary incontinence, referred by Dr. Bailey Martel. She has had problems with incontinence almost her entire life. In 1965 she had fibroid removal and bladder lift surgery and since then she had not had any "sensation to void". She slowly developed incontinence which is unaware. This became worse after spine surgery in  and after COVID vaccine in 2022. After COVID vaccine she feels her urination is much worse and less controlled. She had some bowel incontinence at one point as well.  If she time voids, she can urinate.  She also reports a burning in urethra after urination, it did not improve after stopping wiping with wipes after urination as suggested by her GYN. \par \par She wears 3-4 depends/day. If she wears pads she has to change for frequently and sometimes has to change her clothes. She leaks more with walking or on treadmill. She saw a urologist 10 years ago and started her on a medication that she does not remember but it did not work and she felt like he did not even evaluate her so she never returned. She sometimes takes a supplement called "Enuraid" which is helpful and has belladonna in it, but it was discontinued. \par \par She has history of frequent UTIs and reports when she has urine infection it "can go straight to her blood." She takes Cipro when she feels a UTI coming on. Her GYN gave her nitrofurantoin, but she has not needed it yet. Her last UTI was 2022. She will sometimes has a uncomfortable feeling of a possible UTI but if she drinks water and has cranberry juice, it will clear out. \par \par Sexually active: No  \par \par Udip: trace intact blood, large WBCs \par PVR: 64 cc \par \par Prolapse sensation: denies \par \par PMH: asthma, T2DM, HTN, \par PSH: spinal stenosis surgery (3/2020), surgery R thumb (2021); Di Bree laser 2016 (vaginal dryness) \par FH: lung CA (father); mother Stroke; denies  malignancies \par SH: non-smoker, no alcohol, , retired\par \par Habits: 1-2 cups green tea; water \par \par Allergies: NKDA \par \par Meds: metoprolol, metformin, albuterol, vitamin D, Vagifem TID, Boric Acid TID

## 2022-12-01 NOTE — ADDENDUM
[FreeTextEntry1] : A portion of this note was written by [Kayla Carreno] on 11/30/2022  acting as a scribe for Dr. Bhandari. \par \par I have personally reviewed the chart and agree that the record accurately reflects my personal performance and the history, physical exam, assessment, and plan.\par

## 2023-01-04 LAB
BACTERIA UR CULT: NORMAL
BILIRUB UR QL STRIP: NORMAL
CLARITY UR: CLEAR
COLLECTION METHOD: NORMAL
GLUCOSE UR-MCNC: 500
HCG UR QL: 0.2 EU/DL
HGB UR QL STRIP.AUTO: NORMAL
KETONES UR-MCNC: NORMAL
LEUKOCYTE ESTERASE UR QL STRIP: NORMAL
NITRITE UR QL STRIP: NORMAL
PH UR STRIP: 5
PROT UR STRIP-MCNC: NORMAL
SP GR UR STRIP: 1.02

## 2023-04-17 NOTE — PHYSICAL THERAPY INITIAL EVALUATION ADULT - RANGE OF MOTION EXAMINATION, REHAB EVAL
no ROM deficits were identified Ivermectin Counseling:  Patient instructed to take medication on an empty stomach with a full glass of water.  Patient informed of potential adverse effects including but not limited to nausea, diarrhea, dizziness, itching, and swelling of the extremities or lymph nodes.  The patient verbalized understanding of the proper use and possible adverse effects of ivermectin.  All of the patient's questions and concerns were addressed.

## 2023-05-16 ENCOUNTER — APPOINTMENT (OUTPATIENT)
Dept: UROLOGY | Facility: CLINIC | Age: 83
End: 2023-05-16
Payer: MEDICARE

## 2023-05-16 PROCEDURE — 99214 OFFICE O/P EST MOD 30 MIN: CPT

## 2023-05-20 NOTE — ADDENDUM
[FreeTextEntry1] : A portion of this note was written by [Jesus Crabtree] on 05/16/2023 acting as a scribe for Dr. Bhandari. \par \par I have personally reviewed the chart and agree that the record accurately reflects my personal performance of the history, physical exam, assessment, and plan.\par \par

## 2023-05-20 NOTE — PHYSICAL EXAM
[General Appearance - Well Developed] : well developed [General Appearance - Well Nourished] : well nourished [Normal Appearance] : normal appearance [Well Groomed] : well groomed [General Appearance - In No Acute Distress] : no acute distress [Abdomen Soft] : soft [Abdomen Tenderness] : non-tender [Costovertebral Angle Tenderness] : no ~M costovertebral angle tenderness [Urethral Meatus] : normal urethra [Edema] : no peripheral edema [] : no respiratory distress [Respiration, Rhythm And Depth] : normal respiratory rhythm and effort [Oriented To Time, Place, And Person] : oriented to person, place, and time [Exaggerated Use Of Accessory Muscles For Inspiration] : no accessory muscle use [Affect] : the affect was normal [Mood] : the mood was normal [Not Anxious] : not anxious [Normal Station and Gait] : the gait and station were normal for the patient's age [No Focal Deficits] : no focal deficits [FreeTextEntry1] : positive CST, no prolapse

## 2023-05-20 NOTE — ASSESSMENT
[FreeTextEntry1] : I discussed the findings and options with Ms. LI WOODWARD in detail.\par \par Regarding her recurrent urinary tract infections, I reviewed the generic prevention recommendations with Ms. WOODWARD , including wiping front to back and increasing her fluid intake. As these urinary tract infections have gotten more frequent after menopause, I recommended that she proceed with vaginal estrogen. She will incorporate these and understands that she should have a urine culture prior to being treated with any courses of antibiotics.\par \par For now, she will finish taking the course of Nitrofurantoin. In addition, we agreed that she will also start taking Uqora and Estrogen tablets to suppress recurrent infections. I advised her to adhere behavioral modification--drink adequate fluids and for her to empty her bladder completely.  \par \par She will return in office for cystoscopy, if despite these measures she continues to get urinary tract infections.\par \par Patient expressed understanding.\par \par

## 2023-06-29 LAB
BILIRUB UR QL STRIP: NORMAL
CLARITY UR: CLEAR
COLLECTION METHOD: NORMAL
GLUCOSE UR-MCNC: NORMAL
HCG UR QL: 0.2 EU/DL
HGB UR QL STRIP.AUTO: NORMAL
KETONES UR-MCNC: NORMAL
LEUKOCYTE ESTERASE UR QL STRIP: NORMAL
NITRITE UR QL STRIP: NORMAL
PH UR STRIP: 5.5
PROT UR STRIP-MCNC: NORMAL
SP GR UR STRIP: >=1.03

## 2023-08-14 NOTE — HISTORY OF PRESENT ILLNESS
Pre-Hospital Care Report (PCR) [FreeTextEntry1] : Ms. LI WOODWARD comes in today for her Urologic follow up. Pt is a 83 yo F with hx of urinary incontinence,  s/p intravesical botox 22 \par \par Pt describes a recent episodes of a urinary tract infection for which she took a 7 day course of Cipro.   This made no impact on her symptoms, (no culture was obtained.)   This was then switched to Nitrofurantoin, which she is currently taking.  She is also taking Estrogen 3 x per week with slight improvement.   \par \par She is taking Trulicity for her type 2 DM, in which she suspects that the side effects can be contributory to her recent urinary symptoms. Subjectively, she is overwhelmed by the amount of medications that she is currently taking. She is concern with taking statin medication--due to urinary side effects.\par \par Notably, pt will be leaving for a trip (May 18)\par \par \par 22--- Pt is an 83 yo F  with UUI s/p intravesical botox injection (100 units) on 22. She presents today for a post void check. Pt reports that she is feeling 80% better - she is now having two episodes of urinary incontinence/day compared to 6-8 episodes/day before botox. \par \par Udip: (did not leave urine sample)\par PVR: 29 cc (to rule out incomplete bladder emptying)\par \par 22-- Pt is an 83 yo F with PHUONG. Urodynamics evaluation from 11/10/22 showed that her bladder is unstable, she is experiencing detrusor instability, and she has mild PHUONG. She presents today to receive intravesical botox. \par \par Udip: (+) glucose\par \par 11/10/22-- Pt is an 83 yo F with unaware incontinence with demonstrated PHUONG on exam. At her last visit on 10/12/22, she was advised to begin PFPT for bladder spasms/to learn about how to control her urination - she attended her first session on 22. She presents today for a urodynamics evaluation. Pt reports that her urinary urgency has slightly improved. \par \par Udip: (+) trace leuks \par \par 10/12/22-- Pt is an 83 yo F  (vaginal) who presents today with urinary incontinence, referred by Dr. Bailey Martel. She has had problems with incontinence almost her entire life. In  she had fibroid removal and bladder lift surgery and since then she had not had any "sensation to void". She slowly developed incontinence which is unaware. This became worse after spine surgery in  and after COVID vaccine in 2022. After COVID vaccine she feels her urination is much worse and less controlled. She had some bowel incontinence at one point as well.  If she time voids, she can urinate.  She also reports a burning in urethra after urination, it did not improve after stopping wiping with wipes after urination as suggested by her GYN. \par \par She wears 3-4 depends/day. If she wears pads she has to change for frequently and sometimes has to change her clothes. She leaks more with walking or on treadmill. She saw a urologist 10 years ago and started her on a medication that she does not remember but it did not work and she felt like he did not even evaluate her so she never returned. She sometimes takes a supplement called "Enuraid" which is helpful and has belladonna in it, but it was discontinued. \par \par She has history of frequent UTIs and reports when she has urine infection it "can go straight to her blood." She takes Cipro when she feels a UTI coming on. Her GYN gave her nitrofurantoin, but she has not needed it yet. Her last UTI was 2022. She will sometimes has a uncomfortable feeling of a possible UTI but if she drinks water and has cranberry juice, it will clear out. \par \par Sexually active: No  \par \par Udip: trace intact blood, large WBCs \par PVR: 64 cc \par \par Prolapse sensation: denies \par \par PMH: asthma, T2DM, HTN, \par PSH: spinal stenosis surgery (3/2020), surgery R thumb (2021); Di Bree laser 2016 (vaginal dryness) \par FH: lung CA (father); mother Stroke; denies  malignancies \par SH: non-smoker, no alcohol, , retired\par \par Habits: 1-2 cups green tea; water \par \par Allergies: NKDA \par \par Meds: metoprolol, metformin, albuterol, vitamin D, Vagifem TID, Boric Acid TID

## 2023-10-11 NOTE — PHYSICAL THERAPY INITIAL EVALUATION ADULT - ADL SKILLS, REHAB EVAL
Your left knee xray shows no worrisome findings.  Your left leg ultrasound is negative for DVT.  No concerning lab findings.  Lyme's test result is still pending.  Possibly your left leg pain could be coming from your back causing pain down the leg.    Tylenol 650 mg every 4-6 hours as needed for pain.  Ibuprofen 400-600 mg every 6-8 hours as needed for pain  (take with food, stop if causing stomach pains.)  Oxycodone 5 mg every 6 hours as needed for severe pain. Use sparingly. Do not drink alcohol or drive on this medication.     Recheck with your clinic provider.      
independent

## 2024-01-23 ENCOUNTER — APPOINTMENT (OUTPATIENT)
Dept: UROLOGY | Facility: CLINIC | Age: 84
End: 2024-01-23

## 2025-09-18 ENCOUNTER — APPOINTMENT (OUTPATIENT)
Dept: ORTHOPEDIC SURGERY | Facility: CLINIC | Age: 85
End: 2025-09-18

## 2025-09-18 VITALS — RESPIRATION RATE: 16 BRPM | BODY MASS INDEX: 22.16 KG/M2 | HEIGHT: 65 IN | WEIGHT: 133 LBS

## 2025-09-18 DIAGNOSIS — M79.644 PAIN IN RIGHT FINGER(S): ICD-10-CM

## 2025-09-18 RX ORDER — TIRZEPATIDE 7.5 MG/.5ML
INJECTION, SOLUTION SUBCUTANEOUS
Refills: 0 | Status: ACTIVE | COMMUNITY